# Patient Record
Sex: FEMALE | Race: WHITE | NOT HISPANIC OR LATINO | URBAN - METROPOLITAN AREA
[De-identification: names, ages, dates, MRNs, and addresses within clinical notes are randomized per-mention and may not be internally consistent; named-entity substitution may affect disease eponyms.]

---

## 2024-09-16 ENCOUNTER — OFFICE VISIT (OUTPATIENT)
Dept: DERMATOLOGY | Facility: IMAGING CENTER | Age: 49
End: 2024-09-16
Payer: OTHER MISCELLANEOUS

## 2024-09-16 DIAGNOSIS — L40.8 SEBOPSORIASIS: ICD-10-CM

## 2024-09-16 PROCEDURE — 99203 OFFICE O/P NEW LOW 30 MIN: CPT | Performed by: STUDENT IN AN ORGANIZED HEALTH CARE EDUCATION/TRAINING PROGRAM

## 2024-09-16 RX ORDER — CLOBETASOL PROPIONATE 0.5 MG/ML
SOLUTION TOPICAL
Qty: 50 ML | Refills: 5 | Status: SHIPPED | OUTPATIENT
Start: 2024-09-16

## 2024-09-16 RX ORDER — KETOCONAZOLE 20 MG/ML
SHAMPOO TOPICAL
Qty: 120 ML | Refills: 11 | Status: SHIPPED | OUTPATIENT
Start: 2024-09-16

## 2024-09-16 NOTE — PROGRESS NOTES
[facility] of [facility] DERMATOLOGY CLINIC NOTE      HPI:  Angela Chavez is a 49 y.o. female who presents today for evaluation:   Chief Complaint   Patient presents with    Rash        In May, developed bumps on back of neck that she thought were bug bites when she was in [place]. Expanded into itchy scaly annular plaque into June. Start topical lotrimin, this helped, area has now cleared. A week ago, developed  flaking and itching on posterior scalp, seemed like the same process as before. Is very itchy. No new products, changes to shampoos, has never had rash like this before.      Review of Systems: No fevers, chill. Pertinent positives and negatives above.       Medications, Medical History, Surgical History, Family History & Allergies:  Reviewed in the chart, relevant history noted above.         PHYSICAL EXAM, ASSESSMENT, & PLAN (per problem):   A focused skin exam was performed including the affected areas of the head (including face) and neck. Notable findings on exam today listed below and/or in assessment/plan..       Seborrheic dermatitis / psoriasis overlap  Exam: posterior scalp at hairline with erythematous thin plaque with pink to yellow scale   - ddx may include eczematous, biopsy if not responding to above  - educated patient about diagnosis, management options, and expectations of treatment  - start ketoconazole 2% shampoo to affected area daily, let soak for 5 min prior to rinsing   - start clobetasol 0.05% solution twice daily to affected area on scalp          Follow up: sheila Araujo MD   [facility]

## 2024-10-16 ENCOUNTER — APPOINTMENT (OUTPATIENT)
Dept: DERMATOLOGY | Facility: IMAGING CENTER | Age: 49
End: 2024-10-16
Payer: OTHER MISCELLANEOUS